# Patient Record
Sex: FEMALE | Race: WHITE
[De-identification: names, ages, dates, MRNs, and addresses within clinical notes are randomized per-mention and may not be internally consistent; named-entity substitution may affect disease eponyms.]

---

## 2017-02-02 ENCOUNTER — HOSPITAL ENCOUNTER (OUTPATIENT)
Age: 37
Discharge: HOME | End: 2017-02-02
Payer: MEDICAID

## 2017-02-02 DIAGNOSIS — J20.9: Primary | ICD-10-CM

## 2017-03-13 ENCOUNTER — HOSPITAL ENCOUNTER (OUTPATIENT)
Dept: HOSPITAL 76 - DI.S | Age: 37
Discharge: HOME | End: 2017-03-13
Attending: NURSE PRACTITIONER
Payer: MEDICAID

## 2017-03-13 ENCOUNTER — HOSPITAL ENCOUNTER (OUTPATIENT)
Age: 37
End: 2017-03-13
Payer: MEDICAID

## 2017-03-13 DIAGNOSIS — I10: Primary | ICD-10-CM

## 2017-03-13 DIAGNOSIS — Z13.220: ICD-10-CM

## 2017-03-13 DIAGNOSIS — E66.01: ICD-10-CM

## 2017-03-13 DIAGNOSIS — Z13.1: ICD-10-CM

## 2017-03-13 DIAGNOSIS — M25.561: Primary | ICD-10-CM

## 2017-03-13 NOTE — XRAY REPORT
THREE-VIEW RIGHT KNEE:  03/13/2017

 

CLINICAL INDICATION:  Pain. 

 

FINDINGS:  AP, lateral, sunrise views of the right knee demonstrate no evidence of fracture or disloc
ation.  The joint spaces are preserved.  No effusion is present. 

 

IMPRESSION:  NORMAL RIGHT KNEE. 

 

 

 

DD:03/13/2017 15:27:42  DT: 03/13/2017 15:31  JOB #: [id number]  EXT JOB #:[id number]

## 2017-04-05 ENCOUNTER — HOSPITAL ENCOUNTER (OUTPATIENT)
Age: 37
Discharge: HOME | End: 2017-04-05
Payer: MEDICAID

## 2017-04-05 ENCOUNTER — HOSPITAL ENCOUNTER (OUTPATIENT)
Age: 37
End: 2017-04-05
Payer: MEDICAID

## 2017-04-05 DIAGNOSIS — L03.90: Primary | ICD-10-CM

## 2017-08-08 ENCOUNTER — HOSPITAL ENCOUNTER (OUTPATIENT)
Dept: HOSPITAL 76 - DI.S | Age: 37
Discharge: HOME | End: 2017-08-08
Attending: NURSE PRACTITIONER
Payer: MEDICAID

## 2017-08-08 DIAGNOSIS — R06.02: Primary | ICD-10-CM

## 2017-08-08 PROCEDURE — 71020: CPT

## 2017-08-08 NOTE — XRAY REPORT
TWO-VIEW CHEST:  08/08/2017

 

CLINICAL INDICATION:  Shortness of breath. 

 

FINDINGS:  Frontal and lateral views of the chest are compared to previous films of 02/02/2017.  The 
cardiac silhouette is within normal limits.  The lungs remain clear.  No effusion or pneumothorax is 
present. 

 

IMPRESSION:  NORMAL CHEST, UNCHANGED. 

 

 

 

DD:08/08/2017 15:24:9  DT: 08/08/2017 16:31  JOB #: [id number]  EXT JOB #:[id number]

## 2017-11-21 ENCOUNTER — HOSPITAL ENCOUNTER (EMERGENCY)
Dept: HOSPITAL 76 - ED | Age: 37
Discharge: HOME | End: 2017-11-21
Payer: MEDICAID

## 2017-11-21 VITALS — SYSTOLIC BLOOD PRESSURE: 154 MMHG | DIASTOLIC BLOOD PRESSURE: 114 MMHG

## 2017-11-21 DIAGNOSIS — E66.9: ICD-10-CM

## 2017-11-21 DIAGNOSIS — Z87.891: ICD-10-CM

## 2017-11-21 DIAGNOSIS — R07.9: Primary | ICD-10-CM

## 2017-11-21 DIAGNOSIS — I10: ICD-10-CM

## 2017-11-21 DIAGNOSIS — R00.0: ICD-10-CM

## 2017-11-21 LAB
ALBUMIN/GLOB SERPL: 1.3 {RATIO} (ref 1–2.2)
ANION GAP SERPL CALCULATED.4IONS-SCNC: 11 MMOL/L (ref 6–13)
BASOPHILS NFR BLD AUTO: 0.1 10^3/UL (ref 0–0.1)
BASOPHILS NFR BLD AUTO: 0.5 %
BILIRUB BLD-MCNC: 0.4 MG/DL (ref 0.2–1)
BUN SERPL-MCNC: 12 MG/DL (ref 6–20)
CALCIUM UR-MCNC: 9.4 MG/DL (ref 8.5–10.3)
CHLORIDE SERPL-SCNC: 100 MMOL/L (ref 101–111)
CO2 SERPL-SCNC: 25 MMOL/L (ref 21–32)
CREAT SERPLBLD-SCNC: 0.8 MG/DL (ref 0.4–1)
CUL URINE ADD CHARGE: (no result)
EOSINOPHIL # BLD AUTO: 0.1 10^3/UL (ref 0–0.7)
EOSINOPHIL NFR BLD AUTO: 1.3 %
ERYTHROCYTE [DISTWIDTH] IN BLOOD BY AUTOMATED COUNT: 13.4 % (ref 12–15)
GFRSERPLBLD MDRD-ARVRAT: 82 ML/MIN/{1.73_M2} (ref 89–?)
GLOBULIN SER-MCNC: 3.1 G/DL (ref 2.1–4.2)
GLUCOSE SERPL-MCNC: 105 MG/DL (ref 70–100)
HCG UR QL: NEGATIVE
HCT VFR BLD AUTO: 43.8 % (ref 37–47)
HGB UR QL STRIP: 14.6 G/DL (ref 12–16)
LIPASE SERPL-CCNC: 17 U/L (ref 22–51)
LYMPHOCYTES # SPEC AUTO: 2.6 10^3/UL (ref 1.5–3.5)
LYMPHOCYTES NFR BLD AUTO: 24.2 %
MCH RBC QN AUTO: 29.1 PG (ref 27–31)
MCHC RBC AUTO-ENTMCNC: 33.4 G/DL (ref 32–36)
MCV RBC AUTO: 87.3 FL (ref 81–99)
MONOCYTES # BLD AUTO: 0.5 10^3/UL (ref 0–1)
MONOCYTES NFR BLD AUTO: 4.3 %
NEUTROPHILS # BLD AUTO: 7.5 10^3/UL (ref 1.5–6.6)
NEUTROPHILS # SNV AUTO: 10.7 X10^3/UL (ref 4.8–10.8)
NEUTROPHILS NFR BLD AUTO: 69.7 %
NRBC # BLD AUTO: 0 /100WBC
PDW BLD AUTO: 9.1 FL (ref 7.9–10.8)
PH UR STRIP.AUTO: 6 PH (ref 5–7.5)
POTASSIUM SERPL-SCNC: 3.2 MMOL/L (ref 3.5–5)
PROT SPEC-MCNC: 7.1 G/DL (ref 6.7–8.2)
RBC MAR: 5.02 10^6/UL (ref 4.2–5.4)
SODIUM SERPLBLD-SCNC: 136 MMOL/L (ref 135–145)
SP GR UR STRIP.AUTO: 1.02 (ref 1–1.03)
UA CHARGE (STRIP ONLY): YES
UA W/ MICROSCOPIC CHARGE: (no result)
UR CULTURE IF IND: (no result)
UROBILINOGEN UR STRIP.AUTO-MCNC: NEGATIVE MG/DL
WBC # BLD: 10.7 X10^3/UL

## 2017-11-21 PROCEDURE — 99284 EMERGENCY DEPT VISIT MOD MDM: CPT

## 2017-11-21 PROCEDURE — 85025 COMPLETE CBC W/AUTO DIFF WBC: CPT

## 2017-11-21 PROCEDURE — 85379 FIBRIN DEGRADATION QUANT: CPT

## 2017-11-21 PROCEDURE — 87086 URINE CULTURE/COLONY COUNT: CPT

## 2017-11-21 PROCEDURE — 81025 URINE PREGNANCY TEST: CPT

## 2017-11-21 PROCEDURE — 71275 CT ANGIOGRAPHY CHEST: CPT

## 2017-11-21 PROCEDURE — 80053 COMPREHEN METABOLIC PANEL: CPT

## 2017-11-21 PROCEDURE — 36415 COLL VENOUS BLD VENIPUNCTURE: CPT

## 2017-11-21 PROCEDURE — 81003 URINALYSIS AUTO W/O SCOPE: CPT

## 2017-11-21 PROCEDURE — 81001 URINALYSIS AUTO W/SCOPE: CPT

## 2017-11-21 PROCEDURE — 93005 ELECTROCARDIOGRAM TRACING: CPT

## 2017-11-21 PROCEDURE — 84484 ASSAY OF TROPONIN QUANT: CPT

## 2017-11-21 PROCEDURE — 83690 ASSAY OF LIPASE: CPT

## 2017-11-21 NOTE — ED PHYSICIAN DOCUMENTATION
History of Present Illness





- Stated complaint


Stated Complaint: CP





- Chief complaint


Chief Complaint: General





- History obtained from


History obtained from: Patient





- History of Present Illness


Timing: Other (36-year-old woman who had a DVT a little over a year ago because 

of combination of smoking, obesity, and oral contraceptive pills.  She was 

maintained on Xarelto for a year and has been off of it for about 2 months.  

Last night she developed mild pain behind the left breast which today radiated 

in the left arm.  She is currently pain-free.  She does complain of a little 

bit of shortness of breath but denies pedal edema or calf pain.)





Review of Systems


Ten Systems: 10 systems reviewed and negative


Constitutional: denies: Fever, Chills, Fatigue


Cardiac: denies: Palpitations


Respiratory: denies: Dyspnea, Cough





PD PAST MEDICAL HISTORY





- Past Medical History


Past Medical History: Yes


Cardiovascular: Hypertension, Deep vein thrombosis


Respiratory: None


Neuro: None


Endocrine/Autoimmune: None


GI: None


GYN: None


: None


HEENT: None


Psych: Depression, Anxiety


Musculoskeletal: None


Derm: None





- Past Surgical History


Past Surgical History: Yes


General: EGD





- Present Medications


Home Medications: 


 Ambulatory Orders











 Medication  Instructions  Recorded  Confirmed


 


ARIPiprazole [Abilify] 15 mg PO DAILY 07/13/14 11/21/17


 


DULoxetine [Cymbalta] 90 mg PO DAILY 07/13/14 11/21/17


 


Methadone 7 mg PO DAILY 02/17/16 11/21/17


 


Metoclopramide [Reglan] 10 mg PO DAILY 02/17/16 11/21/17


 


clonazePAM [Clonazepam] 1 mg PO TID 11/21/17 11/21/17














- Allergies


Allergies/Adverse Reactions: 


 Allergies











Allergy/AdvReac Type Severity Reaction Status Date / Time


 


No Known Drug Allergies Allergy   Verified 01/29/16 14:13














- Social History


Does the pt smoke?: Yes


Smoking Status: Current every day smoker


Does the pt drink ETOH?: No


Does the pt have substance abuse?: No





- Family History


Family history: reports: Non contributory





- Immunizations


Immunizations are current?: Yes





- POLST


Patient has POLST: No





PD ED PE NORMAL





- Vitals


Vital signs reviewed: Yes





- General


General: Alert and oriented X 3, No acute distress





- HEENT


HEENT: PERRL, EOMI





- Neck


Neck: Supple, no meningeal sign, No bony TTP





- Cardiac


Cardiac: Other (Tachycardic, regular, no murmur)





- Respiratory


Respiratory: No respiratory distress, Clear bilaterally





- Abdomen


Abdomen: Soft, Non tender





- Extremities


Extremities: No edema, No calf tenderness / cord





- Neuro


Neuro: Alert and oriented X 3, Normal speech





- Psych


Psych: Normal mood, Normal affect





Results





- Vitals


Vitals: 


 Vital Signs - 24 hr











  11/21/17 11/21/17 11/21/17





  18:30 20:27 21:11


 


Temperature 36.2 C L  


 


Heart Rate 129 H 99 103 H


 


Respiratory 18 17 22





Rate   


 


Blood Pressure 159/104 H 155/78 H 159/109 H


 


O2 Saturation 96 94 95








 Oxygen











O2 Source                      Room air

















- EKG (time done)


  ** 1832


Rate: Rate (enter#) (116)


Rhythm: NSR, LAE


Axis: Normal


Intervals: Normal WI


QRS: Normal


Ischemia: Normal ST segments


Computer interpretation: Agree with computer





- Labs


Labs: 


 Laboratory Tests











  11/21/17 11/21/17 11/21/17





  18:45 19:14 19:14


 


WBC   10.7 


 


RBC   5.02 


 


Hgb   14.6 


 


Hct   43.8 


 


MCV   87.3 


 


MCH   29.1 


 


MCHC   33.4 


 


RDW   13.4 


 


Plt Count   301 


 


MPV   9.1 


 


Neut #   7.5 H 


 


Lymph #   2.6 


 


Mono #   0.5 


 


Eos #   0.1 


 


Baso #   0.1 


 


Absolute Nucleated RBC   0.00 


 


Nucleated RBC %   0.0 


 


D-Dimer   


 


Sodium    136


 


Potassium    3.2 L


 


Chloride    100 L


 


Carbon Dioxide    25


 


Anion Gap    11.0


 


BUN    12


 


Creatinine    0.8


 


Estimated GFR (MDRD)    82 L


 


Glucose    105 H


 


Calcium    9.4


 


Total Bilirubin    0.4


 


AST    18


 


ALT    18


 


Alkaline Phosphatase    72


 


Troponin I   


 


Total Protein    7.1


 


Albumin    4.0


 


Globulin    3.1


 


Albumin/Globulin Ratio    1.3


 


Lipase    17 L


 


Urine Color  YELLOW  


 


Urine Clarity  CLEAR  


 


Urine pH  6.0  


 


Ur Specific Gravity  1.025  


 


Urine Protein  NEGATIVE  


 


Urine Glucose (UA)  NEGATIVE  


 


Urine Ketones  15 H  


 


Urine Occult Blood  NEGATIVE  


 


Urine Nitrite  NEGATIVE  


 


Urine Bilirubin  NEGATIVE  


 


Urine Urobilinogen  0.2 (NORMAL)  


 


Ur Leukocyte Esterase  NEGATIVE  


 


Ur Microscopic Review  NOT INDICATED  


 


Urine Culture Comments  NOT INDICATED  


 


Urine HCG, Qual  NEGATIVE  














  11/21/17 11/21/17





  19:14 19:14


 


WBC  


 


RBC  


 


Hgb  


 


Hct  


 


MCV  


 


MCH  


 


MCHC  


 


RDW  


 


Plt Count  


 


MPV  


 


Neut #  


 


Lymph #  


 


Mono #  


 


Eos #  


 


Baso #  


 


Absolute Nucleated RBC  


 


Nucleated RBC %  


 


D-Dimer   215.0


 


Sodium  


 


Potassium  


 


Chloride  


 


Carbon Dioxide  


 


Anion Gap  


 


BUN  


 


Creatinine  


 


Estimated GFR (MDRD)  


 


Glucose  


 


Calcium  


 


Total Bilirubin  


 


AST  


 


ALT  


 


Alkaline Phosphatase  


 


Troponin I  < 0.04 


 


Total Protein  


 


Albumin  


 


Globulin  


 


Albumin/Globulin Ratio  


 


Lipase  


 


Urine Color  


 


Urine Clarity  


 


Urine pH  


 


Ur Specific Gravity  


 


Urine Protein  


 


Urine Glucose (UA)  


 


Urine Ketones  


 


Urine Occult Blood  


 


Urine Nitrite  


 


Urine Bilirubin  


 


Urine Urobilinogen  


 


Ur Leukocyte Esterase  


 


Ur Microscopic Review  


 


Urine Culture Comments  


 


Urine HCG, Qual  














- Rads (name of study)


  ** CT Angio chest


Radiology: EMP read contemporaneously (Suboptimal bolus timing, no large PE, 

fatty liver.)





PD MEDICAL DECISION MAKING





- ED course


ED course: 





36-year-old woman with history of DVT presents with acute chest pain and some 

tachycardia.  The tachycardia resolved without specific intervention, 

potentially anxiety.  Biggest concern of course was for PE given her history, 

CT angiogram was done, and the results were nondiagnostic because of poor 

contrast bolus timing.  Given this this was followed with a d-dimer which was 

negative.





Departure





- Departure


Disposition: 01 Home, Self Care


Clinical Impression: 


Chest pain


Qualifiers:


 Chest pain type: unspecified Qualified Code(s): R07.9 - Chest pain, unspecified





Condition: Good


Record reviewed to determine appropriate education?: Yes


Instructions:  ED Chest Pain NonCardiac


Comments: 


Call your doctor to arrange a follow-up appointment, make the next available 

appointment.  In the interim, return anytime if worse or if new symptoms 

develop.





Your blood pressure was elevated today on check into the emergency department.  

This does not mean that you have hypertension, it is a common phenomenon to 

come to the emergency department and have elevated blood pressure.  I recommend 

that you see your primary care physician within the week to have it rechecked 

when you are feeling better.

## 2017-11-21 NOTE — CT PRELIMINARY REPORT
Accession: [id number]

Exam: CT CHEST ANGIO (PE)

 

IMPRESSION: 

1. Suboptimal bolus timing. Study nondiagnostic for pulmonary emboli. 

2. No acute pulmonary process. 

3. Fatty liver. No mass.

 

[facility]

 

SITE ID: [id number]

## 2017-11-21 NOTE — CT REPORT
EXAM:

CT ANGIOGRAM CHEST

 

EXAM DATE: 11/21/2017 08:19 PM.

 

CLINICAL HISTORY: Chest pain, history of PE.

 

COMPARISON: 08/08/2017.

 

TECHNIQUE: Routine helical imaging was performed through the chest in the pulmonary arterial phase. I
V Contrast: 80 mL of Isovue 300. Reconstructions: Coronal 3-D MIP reconstructions.Sagittal and corona
l.

 

In accordance with CT protocol optimization, one or more of the following dose reduction techniques w
ere utilized for this exam: automated exposure control, adjustment of mA and/or KV based on patient s
ize, or use of iterative reconstructive technique.

 

FINDINGS: 

Pulmonary Arteries: 

Diagnostic quality: Suboptimal through the segmental arteries. No evidence for acute or chronic centr
al pulmonary emboli. Evaluation of the segmental and subsegmental pulmonary arteries is limited due t
o suboptimal bolus timing.  

 

RV/LV is within normal limits. There is no interventricular septal bowing. There is no reflux of cont
rast material in the IVC.

 

Lungs/Pleura: No consolidation, nodules, or edema. No effusions or pneumothorax.

 

Mediastinum: Normal. No cardiac enlargement or adenopathy. 

 

Thoracic Aorta: Unremarkable.

 

Upper Abdomen:  Small incidental hiatal hernia. Fatty liver. No mass.

 

Other: None.

 

IMPRESSION: 

1. Suboptimal bolus timing. Study nondiagnostic for pulmonary emboli. 

2. No acute pulmonary process. 

3. Fatty liver. No mass.

 

[facility]

Referring Provider Line: [phone]

 

SITE ID: [id number]

## 2018-02-19 ENCOUNTER — HOSPITAL ENCOUNTER (OUTPATIENT)
Dept: HOSPITAL 76 - LAB.R | Age: 38
Discharge: HOME | End: 2018-02-19
Attending: ADVANCED PRACTICE MIDWIFE
Payer: MEDICAID

## 2018-02-19 ENCOUNTER — HOSPITAL ENCOUNTER (OUTPATIENT)
Dept: HOSPITAL 76 - LAB.N | Age: 38
Discharge: HOME | End: 2018-02-19
Attending: ADVANCED PRACTICE MIDWIFE
Payer: MEDICAID

## 2018-02-19 DIAGNOSIS — Z11.3: Primary | ICD-10-CM

## 2018-02-19 PROCEDURE — 87591 N.GONORRHOEAE DNA AMP PROB: CPT

## 2018-02-19 PROCEDURE — 86696 HERPES SIMPLEX TYPE 2 TEST: CPT

## 2018-02-19 PROCEDURE — 86592 SYPHILIS TEST NON-TREP QUAL: CPT

## 2018-02-19 PROCEDURE — 86695 HERPES SIMPLEX TYPE 1 TEST: CPT

## 2018-02-19 PROCEDURE — 86803 HEPATITIS C AB TEST: CPT

## 2018-02-19 PROCEDURE — 87491 CHLMYD TRACH DNA AMP PROBE: CPT

## 2018-02-19 PROCEDURE — 36415 COLL VENOUS BLD VENIPUNCTURE: CPT

## 2018-02-19 PROCEDURE — 81599 UNLISTED MAAA: CPT

## 2018-02-19 PROCEDURE — 87389 HIV-1 AG W/HIV-1&-2 AB AG IA: CPT

## 2018-02-20 LAB
HEPATITIS C ANTIBODY: (no result)
HIV AG/AB 4TH GEN: (no result)
SIGNAL TO CUT-OFF: 0 (ref ?–1)

## 2018-02-21 LAB
HSV 1 IGG TYPE SPECIFIC AB: 45.7 INDEX
HSV 2 IGG TYPE SPECIFIC AB: <0.9 INDEX

## 2018-05-07 ENCOUNTER — HOSPITAL ENCOUNTER (EMERGENCY)
Dept: HOSPITAL 76 - ED | Age: 38
Discharge: HOME | End: 2018-05-07
Payer: MEDICAID

## 2018-05-07 VITALS — SYSTOLIC BLOOD PRESSURE: 146 MMHG | DIASTOLIC BLOOD PRESSURE: 106 MMHG

## 2018-05-07 DIAGNOSIS — I10: Primary | ICD-10-CM

## 2018-05-07 DIAGNOSIS — E87.6: ICD-10-CM

## 2018-05-07 DIAGNOSIS — R94.31: ICD-10-CM

## 2018-05-07 DIAGNOSIS — Z86.718: ICD-10-CM

## 2018-05-07 DIAGNOSIS — F17.200: ICD-10-CM

## 2018-05-07 DIAGNOSIS — K02.9: ICD-10-CM

## 2018-05-07 LAB
ALBUMIN DIAFP-MCNC: 3.7 G/DL (ref 3.2–5.5)
ALBUMIN/GLOB SERPL: 1 {RATIO} (ref 1–2.2)
ALP SERPL-CCNC: 75 IU/L (ref 42–121)
ALT SERPL W P-5'-P-CCNC: 16 IU/L (ref 10–60)
ANION GAP SERPL CALCULATED.4IONS-SCNC: 10 MMOL/L (ref 6–13)
AST SERPL W P-5'-P-CCNC: 21 IU/L (ref 10–42)
BASOPHILS NFR BLD AUTO: 0.1 10^3/UL (ref 0–0.1)
BASOPHILS NFR BLD AUTO: 1 %
BILIRUB BLD-MCNC: 0.4 MG/DL (ref 0.2–1)
BUN SERPL-MCNC: 8 MG/DL (ref 6–20)
CALCIUM UR-MCNC: 8.9 MG/DL (ref 8.5–10.3)
CHLORIDE SERPL-SCNC: 101 MMOL/L (ref 101–111)
CO2 SERPL-SCNC: 28 MMOL/L (ref 21–32)
CREAT SERPLBLD-SCNC: 0.6 MG/DL (ref 0.4–1)
EOSINOPHIL # BLD AUTO: 0.1 10^3/UL (ref 0–0.7)
EOSINOPHIL NFR BLD AUTO: 1.5 %
ERYTHROCYTE [DISTWIDTH] IN BLOOD BY AUTOMATED COUNT: 13.8 % (ref 12–15)
GFRSERPLBLD MDRD-ARVRAT: 114 ML/MIN/{1.73_M2} (ref 89–?)
GLOBULIN SER-MCNC: 3.6 G/DL (ref 2.1–4.2)
GLUCOSE SERPL-MCNC: 100 MG/DL (ref 70–100)
HGB UR QL STRIP: 13.7 G/DL (ref 12–16)
LIPASE SERPL-CCNC: 13 U/L (ref 22–51)
LYMPHOCYTES # SPEC AUTO: 2.2 10^3/UL (ref 1.5–3.5)
LYMPHOCYTES NFR BLD AUTO: 23.9 %
MCH RBC QN AUTO: 29.3 PG (ref 27–31)
MCHC RBC AUTO-ENTMCNC: 34.6 G/DL (ref 32–36)
MCV RBC AUTO: 84.5 FL (ref 81–99)
MONOCYTES # BLD AUTO: 0.5 10^3/UL (ref 0–1)
MONOCYTES NFR BLD AUTO: 5.7 %
NEUTROPHILS # BLD AUTO: 6.2 10^3/UL (ref 1.5–6.6)
NEUTROPHILS # SNV AUTO: 9.2 X10^3/UL (ref 4.8–10.8)
NEUTROPHILS NFR BLD AUTO: 67.9 %
PDW BLD AUTO: 8.5 FL (ref 7.9–10.8)
PLATELET # BLD: 332 10^3/UL (ref 130–450)
PROT SPEC-MCNC: 7.3 G/DL (ref 6.7–8.2)
RBC MAR: 4.68 10^6/UL (ref 4.2–5.4)
SODIUM SERPLBLD-SCNC: 139 MMOL/L (ref 135–145)

## 2018-05-07 PROCEDURE — 93005 ELECTROCARDIOGRAM TRACING: CPT

## 2018-05-07 PROCEDURE — 83735 ASSAY OF MAGNESIUM: CPT

## 2018-05-07 PROCEDURE — 84484 ASSAY OF TROPONIN QUANT: CPT

## 2018-05-07 PROCEDURE — 36415 COLL VENOUS BLD VENIPUNCTURE: CPT

## 2018-05-07 PROCEDURE — 83690 ASSAY OF LIPASE: CPT

## 2018-05-07 PROCEDURE — 85025 COMPLETE CBC W/AUTO DIFF WBC: CPT

## 2018-05-07 PROCEDURE — 99283 EMERGENCY DEPT VISIT LOW MDM: CPT

## 2018-05-07 PROCEDURE — 80053 COMPREHEN METABOLIC PANEL: CPT

## 2018-05-07 NOTE — ED PHYSICIAN DOCUMENTATION
PD HPI HEENT





- Stated complaint


Stated Complaint: TOOTH PX/CP





- Chief complaint


Chief Complaint: Cardiac





- History obtained from


History obtained from: Patient





- History of Present Illness


Timing - onset: How many weeks ago (has had tooth pain for couple of weeks.)


Timing - details: Gradual onset


Location: Tooth (right lower)


Improves: Medication


Associated symptoms: No: Fever, Trismus, Facial swelling


Recently seen: Clinic (had been seen in clinic and Rx abx and nsaids for tooth; 

was doing better but hurting again since off the abx.)





Review of Systems


Constitutional: denies: Fever, Chills


Nose: denies: Rhinorrhea / runny nose, Congestion


Throat: reports: Dental pain / toothache.  denies: Sore throat


Cardiac: reports: Palpitations (intermittently).  denies: Chest pain / pressure

, Pedal edema, Calf pain


Respiratory: denies: Dyspnea, Cough


Neurologic: reports: Generalized weakness.  denies: Near syncope, Syncope, 

Altered mental status, Headache





PD PAST MEDICAL HISTORY





- Past Medical History


Past Medical History: Yes


Cardiovascular: Hypertension, Deep vein thrombosis


Respiratory: None


Neuro: None


Endocrine/Autoimmune: None


GI: None


GYN: None


: None


HEENT: None


Psych: Depression, Anxiety


Musculoskeletal: None


Derm: None





- Past Surgical History


Past Surgical History: Yes


General: EGD





- Present Medications


Home Medications: 


 Ambulatory Orders











 Medication  Instructions  Recorded  Confirmed


 


DULoxetine [Cymbalta] 90 mg PO DAILY 07/13/14 05/07/18


 


clonazePAM [Clonazepam] 1 mg PO TID 11/21/17 05/07/18


 


Cephalexin [Keflex] 500 mg PO TID #20 capsule 05/07/18 


 


HYDROcod/ACETAM 5/325 [Norco 5/325] 1 - 2 ea PO Q6H PRN #15 tablet 05/07/18 


 


Lisinopril 5 mg PO DAILY #30 tablet 05/07/18 


 


Potassium Chloride 10 meq PO BID #20 tablet.er 05/07/18 


 


QUEtiapine [SEROquel] 2 - 3 tab PO PRN PRN 05/07/18 05/07/18














- Allergies


Allergies/Adverse Reactions: 


 Allergies











Allergy/AdvReac Type Severity Reaction Status Date / Time


 


No Known Drug Allergies Allergy   Verified 01/29/16 14:13














- Social History


Does the pt smoke?: Yes


Smoking Status: Current every day smoker


Does the pt drink ETOH?: No


Does the pt have substance abuse?: No





- Immunizations


Immunizations are current?: Yes





- POLST


Patient has POLST: No





PD ED PE NORMAL





- Vitals


Vital signs reviewed: Yes





- General


General: Alert and oriented X 3, No acute distress, Well developed/nourished





- HEENT


HEENT: Ears normal, Pharynx benign.  No: Dentition benign (lower tooth pain 

with cavity. Cavit placed on the shallow cup of it. )





- Neck


Neck: Supple, no meningeal sign, No adenopathy, No JVD, No bruit





- Cardiac


Cardiac: RRR, No murmur





- Respiratory


Respiratory: Clear bilaterally





- Abdomen


Abdomen: Soft, Non tender





- Derm


Derm: Normal color, Warm and dry





- Extremities


Extremities: No tenderness to palpate, Normal ROM s pain, No edema, No calf 

tenderness / cord





- Neuro


Neuro: Alert and oriented X 3, CNs 2-12 intact, No motor deficit, No sensory 

deficit, Normal speech, Other





Results





- Vitals


Vitals: 


 Oxygen











O2 Source                      Room air

















- Labs


Labs: 


 Laboratory Tests











  05/07/18 05/07/18 05/07/18





  17:30 17:30 17:30


 


WBC  9.2  


 


RBC  4.68  


 


Hgb  13.7  


 


Hct  39.5  


 


MCV  84.5  


 


MCH  29.3  


 


MCHC  34.6  


 


RDW  13.8  


 


Plt Count  332  


 


MPV  8.5  


 


Neut #  6.2  


 


Lymph #  2.2  


 


Mono #  0.5  


 


Eos #  0.1  


 


Baso #  0.1  


 


Absolute Nucleated RBC  0.00  


 


Nucleated RBC %  0.0  


 


Sodium   139 


 


Potassium   2.6 L 


 


Chloride   101 


 


Carbon Dioxide   28 


 


Anion Gap   10.0 


 


BUN   8 


 


Creatinine   0.6 


 


Estimated GFR (MDRD)   114 


 


Glucose   100 


 


Calcium   8.9 


 


Magnesium   


 


Total Bilirubin   0.4 


 


AST   21 


 


ALT   16 


 


Alkaline Phosphatase   75 


 


Troponin I    < 0.04


 


Total Protein   7.3 


 


Albumin   3.7 


 


Globulin   3.6 


 


Albumin/Globulin Ratio   1.0 


 


Lipase   13 L 














  05/07/18





  17:30


 


WBC 


 


RBC 


 


Hgb 


 


Hct 


 


MCV 


 


MCH 


 


MCHC 


 


RDW 


 


Plt Count 


 


MPV 


 


Neut # 


 


Lymph # 


 


Mono # 


 


Eos # 


 


Baso # 


 


Absolute Nucleated RBC 


 


Nucleated RBC % 


 


Sodium 


 


Potassium 


 


Chloride 


 


Carbon Dioxide 


 


Anion Gap 


 


BUN 


 


Creatinine 


 


Estimated GFR (MDRD) 


 


Glucose 


 


Calcium 


 


Magnesium  2.0


 


Total Bilirubin 


 


AST 


 


ALT 


 


Alkaline Phosphatase 


 


Troponin I 


 


Total Protein 


 


Albumin 


 


Globulin 


 


Albumin/Globulin Ratio 


 


Lipase 














PD MEDICAL DECISION MAKING





- ED course


Complexity details: reviewed results, considered differential (feeling of 

weakness and some palpitations. She does have low potassium, but not on 

diuretic and denies vomiting/diarrhea, eating disorder. Presume dietary. She 

has tooth pain as well. ), d/w patient





Departure





- Departure


Disposition: 01 Home, Self Care


Clinical Impression: 


 Mild hypertension, Pain due to dental caries, Hypokalemia





Condition: Stable


Record reviewed to determine appropriate education?: Yes


Instructions:  ED Cavity Dental, ED Diet High Potassium


Follow-Up: 


Yudi Raygoza ARNP [Primary Care Provider] - 


Prescriptions: 


Cephalexin [Keflex] 500 mg PO TID #20 capsule


HYDROcod/ACETAM 5/325 [Norco 5/325] 1 - 2 ea PO Q6H PRN #15 tablet


 PRN Reason: Pain


Lisinopril 5 mg PO DAILY #30 tablet


Potassium Chloride 10 meq PO BID #20 tablet.er


Comments: 


Your blood pressures a little bit high but I went think it should preclude 

dental work.  We can start a very low-dose blood pressure medicine as we do 

wanted to get too low when you are feeling well.  Use some antibiotics and pain 

medicine for the tooth.  Your potassium was low and take a potassium supplement 

twice daily for the next 10 days.  Recheck with your primary care in about a 

week or so, call for an appointment.  Follow-up with a dentist as soon as he is 

able to see you.


Forms:  Activity restrictions


Discharge Date/Time: 05/07/18 20:14

## 2018-07-08 ENCOUNTER — HOSPITAL ENCOUNTER (EMERGENCY)
Dept: HOSPITAL 76 - ED | Age: 38
Discharge: HOME | End: 2018-07-08
Payer: MEDICAID

## 2018-07-08 VITALS — SYSTOLIC BLOOD PRESSURE: 145 MMHG | DIASTOLIC BLOOD PRESSURE: 108 MMHG

## 2018-07-08 DIAGNOSIS — I10: ICD-10-CM

## 2018-07-08 DIAGNOSIS — H65.02: Primary | ICD-10-CM

## 2018-07-08 DIAGNOSIS — Z86.718: ICD-10-CM

## 2018-07-08 DIAGNOSIS — F17.200: ICD-10-CM

## 2018-07-08 PROCEDURE — 99283 EMERGENCY DEPT VISIT LOW MDM: CPT

## 2018-07-08 NOTE — ED PHYSICIAN DOCUMENTATION
History of Present Illness





- Stated complaint


Stated Complaint: LT EAR/JAW/HEAD PX





- Chief complaint


Chief Complaint: Heent





- History obtained from


History obtained from: Patient





- History of Present Illness


Timing: Other (1 month)


Pain level max: 8


Pain level now: 6


Quality: aching, dull pain


Improved by: nothing


Worsened by: eating, chewing, touching the L ear





- Additonal information


Additional information: 





Patient saw a dentist 2 days ago and they state that her teeth are normal.





Review of Systems


Constitutional: denies: Fever, Chills


Ears: reports: Ear pain (L)


Nose: reports: Rhinorrhea / runny nose, Congestion, Other (Does have seasonal 

allergies to pollen but has not taken anything)


Throat: reports: Sore throat


Cardiac: denies: Chest pain / pressure


Respiratory: denies: Dyspnea


GI: denies: Nausea, Vomiting, Diarrhea


: denies: Now pregnant EGA


Skin: denies: Rash


Musculoskeletal: denies: Neck pain, Back pain





PD PAST MEDICAL HISTORY





- Past Medical History


Cardiovascular: Hypertension, Deep vein thrombosis


Respiratory: None


Endocrine/Autoimmune: None


GI: None


GYN: None


: None


HEENT: None


Psych: Depression, Anxiety


Musculoskeletal: None


Derm: None





- Past Surgical History


Past Surgical History: Yes


General: EGD





- Present Medications


Home Medications: 


 Ambulatory Orders











 Medication  Instructions  Recorded  Confirmed


 


DULoxetine [Cymbalta] 90 mg PO DAILY 07/13/14 05/07/18


 


clonazePAM [Clonazepam] 1 mg PO TID 11/21/17 05/07/18


 


QUEtiapine [SEROquel] 2 - 3 tab PO PRN PRN 05/07/18 05/07/18


 


Cetirizine HCl/Pseudoephedrine 1 each PO BID PRN #30 tab.er.12h 07/08/18 





[Zyrtec-D Tablet]   


 


Tramadol HCl 50 mg PO Q6H PRN #10 tablet 07/08/18 


 


predniSONE [Prednisone] 40 mg PO DAILY #10 tablet 07/08/18 














- Allergies


Allergies/Adverse Reactions: 


 Allergies











Allergy/AdvReac Type Severity Reaction Status Date / Time


 


No Known Drug Allergies Allergy   Verified 07/08/18 14:34














- Social History


Does the pt smoke?: Yes


Smoking Status: Current every day smoker


Does the pt drink ETOH?: No


Does the pt have substance abuse?: No





- Immunizations


Immunizations are current?: Yes





- POLST


Patient has POLST: No





PD ED PE NORMAL





- Vitals


Vital signs reviewed: Yes





- General


General: Alert and oriented X 3, No acute distress





- HEENT


HEENT: Moist mucous membranes, Dentition benign, Other (Mild posterior 

oropharyngeal erythema without tonsillar exudates.  Uvula midline.  Normal 

phonation.  No trismus.  Bilateral tympanic membranes have serous fluid 

present.  No evidence of infection)





- Neck


Neck: Supple, no meningeal sign, No adenopathy





- Cardiac


Cardiac: RRR





- Respiratory


Respiratory: No respiratory distress, Clear bilaterally





- Derm


Derm: Warm and dry, No rash





- Neuro


Neuro: Alert and oriented X 3





Results





- Vitals


Vitals: 


 Vital Signs - 24 hr











  07/08/18





  14:32


 


Temperature 36.4 C L


 


Heart Rate 99


 


Respiratory 18





Rate 


 


Blood Pressure 145/108 H


 


O2 Saturation 98








 Oxygen











O2 Source                      Room air

















PD MEDICAL DECISION MAKING





- ED course


Complexity details: considered differential, d/w patient


ED course: 





Patient is a 37-year-old female with bilateral acute serous otitis media.  Will 

place on decongestants and steroids.  She is well-appearing, nontoxic.  Denies 

any possibility of pregnancy.  Is not breast-feeding.  Patient counseled 

regarding signs and symptoms for which I believe and urgent re-evaluation would 

be necessary. Patient with good understanding of and agreement to plan and is 

comfortable going home at this time





This document was made in part using voice recognition software. While efforts 

are made to proofread this document, sound alike and grammatical errors may 

occur.





- Sepsis Event


Vital Signs: 


 Vital Signs - 24 hr











  07/08/18





  14:32


 


Temperature 36.4 C L


 


Heart Rate 99


 


Respiratory 18





Rate 


 


Blood Pressure 145/108 H


 


O2 Saturation 98








 Oxygen











O2 Source                      Room air

















Departure





- Departure


Disposition: 01 Home, Self Care


Clinical Impression: 


Serous otitis media


Qualifiers:


 Chronicity: acute Laterality: left Recurrence: not specified as recurrent 

Qualified Code(s): H65.02 - Acute serous otitis media, left ear





Condition: Good


Instructions:  ED Otitis Media Serous Adult


Follow-Up: 


Yudi Raygoza ARNP [Primary Care Provider] - Within 1 week


Prescriptions: 


Cetirizine HCl/Pseudoephedrine [Zyrtec-D Tablet] 1 each PO BID PRN #30 

tab.er.12h


 PRN Reason: Nasal Congestion


predniSONE [Prednisone] 40 mg PO DAILY #10 tablet


Tramadol HCl 50 mg PO Q6H PRN #10 tablet


 PRN Reason: pain


Comments: 


Return if you worsen. Take all medications as prescribed. 


Discharge Date/Time: 07/08/18 15:56

## 2018-08-06 ENCOUNTER — HOSPITAL ENCOUNTER (OUTPATIENT)
Dept: HOSPITAL 76 - LAB.F | Age: 38
Discharge: HOME | End: 2018-08-06
Attending: INTERNAL MEDICINE
Payer: MEDICAID

## 2018-08-06 DIAGNOSIS — G50.0: Primary | ICD-10-CM

## 2018-08-06 LAB
ALBUMIN DIAFP-MCNC: 4.2 G/DL
ALBUMIN/GLOB SERPL: 1.3 {RATIO}
ALP SERPL-CCNC: 73 IU/L
ALT SERPL W P-5'-P-CCNC: 13 IU/L
ANION GAP SERPL CALCULATED.4IONS-SCNC: 8 MMOL/L
AST SERPL W P-5'-P-CCNC: 17 IU/L
BASOPHILS NFR BLD AUTO: 0 10^3/UL
BASOPHILS NFR BLD AUTO: 0.5 %
BILIRUB BLD-MCNC: 0.2 MG/DL
BUN SERPL-MCNC: 12 MG/DL
CALCIUM UR-MCNC: 8.9 MG/DL
CHLORIDE SERPL-SCNC: 105 MMOL/L
CO2 SERPL-SCNC: 25 MMOL/L
CREAT SERPLBLD-SCNC: 0.9 MG/DL
EOSINOPHIL # BLD AUTO: 0.1 10^3/UL
EOSINOPHIL NFR BLD AUTO: 0.9 %
ERYTHROCYTE [DISTWIDTH] IN BLOOD BY AUTOMATED COUNT: 15 %
GFRSERPLBLD MDRD-ARVRAT: 71 ML/MIN/{1.73_M2}
GLOBULIN SER-MCNC: 3.3 G/DL
GLUCOSE SERPL-MCNC: 72 MG/DL
HGB UR QL STRIP: 14.8 G/DL
LYMPHOCYTES # SPEC AUTO: 3.8 10^3/UL
LYMPHOCYTES NFR BLD AUTO: 52.6 %
MCH RBC QN AUTO: 29.2 PG
MCHC RBC AUTO-ENTMCNC: 33.1 G/DL
MCV RBC AUTO: 88.1 FL
MONOCYTES # BLD AUTO: 0.5 10^3/UL
MONOCYTES NFR BLD AUTO: 6.6 %
NEUTROPHILS # BLD AUTO: 2.9 10^3/UL
NEUTROPHILS # SNV AUTO: 7.3 X10^3/UL
NEUTROPHILS NFR BLD AUTO: 39.4 %
PDW BLD AUTO: 9.3 FL
PLATELET # BLD: 300 10^3/UL
PROT SPEC-MCNC: 7.5 G/DL
RBC MAR: 5.06 10^6/UL
SODIUM SERPLBLD-SCNC: 138 MMOL/L

## 2018-08-06 PROCEDURE — 36415 COLL VENOUS BLD VENIPUNCTURE: CPT

## 2018-08-06 PROCEDURE — 85025 COMPLETE CBC W/AUTO DIFF WBC: CPT

## 2018-08-06 PROCEDURE — 80053 COMPREHEN METABOLIC PANEL: CPT

## 2018-09-04 ENCOUNTER — HOSPITAL ENCOUNTER (EMERGENCY)
Dept: HOSPITAL 76 - ED | Age: 38
Discharge: HOME | End: 2018-09-04
Payer: MEDICAID

## 2018-09-04 VITALS — DIASTOLIC BLOOD PRESSURE: 93 MMHG | SYSTOLIC BLOOD PRESSURE: 137 MMHG

## 2018-09-04 DIAGNOSIS — I10: ICD-10-CM
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
## 2018-09-04 DIAGNOSIS — F17.200: ICD-10-CM

## 2018-09-04 DIAGNOSIS — S60.425A: Primary | ICD-10-CM

## 2018-09-04 DIAGNOSIS — Z86.718: ICD-10-CM

## 2018-09-04 PROCEDURE — 99283 EMERGENCY DEPT VISIT LOW MDM: CPT

## 2018-09-04 NOTE — ED PHYSICIAN DOCUMENTATION
History of Present Illness





- Stated complaint


Stated Complaint: L FINGER BLISTER





- Chief complaint


Chief Complaint: Wound





- History obtained from


History obtained from: Patient





- History of Present Illness


Timing: Today


Pain level max: 5


Pain level now: 4


Improved by: nothing


Worsened by: palpation





- Additonal information


Additional information: 





blister to the L ring finger today. Saw PCP, but increased in size so came for 

eval.  Patient states that she does not know what caused the blistering.  

Denies any thermal burns.  No new lotions or detergents.  No new medications.  

No fevers.  No possibility of pregnancy





Review of Systems


: denies: Now pregnant EGA





PD PAST MEDICAL HISTORY





- Past Medical History


Past Medical History: Yes


Cardiovascular: Hypertension, Deep vein thrombosis


Respiratory: None


Endocrine/Autoimmune: None


GI: None


GYN: None


: None


HEENT: None


Psych: Depression, Anxiety


Musculoskeletal: None


Derm: None





- Past Surgical History


Past Surgical History: Yes


General: EGD





- Present Medications


Home Medications: 


 Ambulatory Orders











 Medication  Instructions  Recorded  Confirmed


 


DULoxetine [Cymbalta] 90 mg PO DAILY 07/13/14 05/07/18


 


clonazePAM [Clonazepam] 1 mg PO TID 11/21/17 05/07/18


 


QUEtiapine [SEROquel] 2 - 3 tab PO PRN PRN 05/07/18 05/07/18


 


Cetirizine HCl/Pseudoephedrine 1 each PO BID PRN #30 tab.er.12h 07/08/18 





[Zyrtec-D Tablet]   


 


Tramadol HCl 50 mg PO Q6H PRN #10 tablet 07/08/18 


 


predniSONE [Prednisone] 40 mg PO DAILY #10 tablet 07/08/18 














- Allergies


Allergies/Adverse Reactions: 


 Allergies











Allergy/AdvReac Type Severity Reaction Status Date / Time


 


No Known Drug Allergies Allergy   Verified 09/04/18 19:19














- Social History


Does the pt smoke?: Yes


Smoking Status: Current every day smoker


Does the pt drink ETOH?: No


Does the pt have substance abuse?: No





- Immunizations


Immunizations are current?: Yes





- POLST


Patient has POLST: No





PD ED PE NORMAL





- Vitals


Vital signs reviewed: Yes





- General


General: Alert and oriented X 3, No acute distress





- HEENT


HEENT: Moist mucous membranes





- Derm


Derm: Warm and dry





- Extremities


Extremities: Other (Left ring finger, there is an intact blister approximately 

1 x 1.5 cm on the dorsal aspect of the left ring finger, distal phalanx that 

abuts the nail fold.  No signs of infection.  Neurovascularly intact)





- Neuro


Neuro: Alert and oriented X 3





- Psych


Psych: Normal mood, Normal affect





Results





- Vitals


Vitals: 


 Vital Signs - 24 hr











  09/04/18





  19:18


 


Temperature 36.3 C L


 


Heart Rate 125 H


 


Respiratory 17





Rate 


 


Blood Pressure 137/93 H


 


O2 Saturation 94








 Oxygen











O2 Source                      Room air

















PD MEDICAL DECISION MAKING





- ED course


Complexity details: considered differential, d/w patient


ED course: 





Patient is a 37-year-old female who presents to the emergency department with a 

symptomatic left ring finger blister.  After discussion, she would like it 

opened and unroofed.  She was counseled regarding potential infectious risk 

with this.  The blister was unroofed with an 18-gauge needle and the fluid was 

drained.  Tolerated well.  Tetanus is up-to-date.  Wound was cleansed and 

bandaged.  Will have her follow-up with her doctor for further care.  Warnings 

of infection and instructions on wound care given at bedside.   Patient 

counseled regarding signs and symptoms for which I believe and urgent re-

evaluation would be necessary. Patient with good understanding of and agreement 

to plan and is comfortable going home at this time





This document was made in part using voice recognition software. While efforts 

are made to proofread this document, sound alike and grammatical errors may 

occur.





- Sepsis Event


Vital Signs: 


 Vital Signs - 24 hr











  09/04/18





  19:18


 


Temperature 36.3 C L


 


Heart Rate 125 H


 


Respiratory 17





Rate 


 


Blood Pressure 137/93 H


 


O2 Saturation 94








 Oxygen











O2 Source                      Room air

















Departure





- Departure


Disposition: 01 Home, Self Care


Clinical Impression: 


 Blister





Condition: Good


Instructions:  ED Blister


Follow-Up: 


Yudi Raygoza ARNP [Primary Care Provider] - Within 1 week


Comments: 


The cause of your blister is unclear. You can use motrin or tylenol as needed 

for pain. Apply antibiotic ointment twice daily until healed. Return if you 

worsen.


Discharge Date/Time: 09/04/18 19:43